# Patient Record
Sex: FEMALE | Employment: FULL TIME | URBAN - METROPOLITAN AREA
[De-identification: names, ages, dates, MRNs, and addresses within clinical notes are randomized per-mention and may not be internally consistent; named-entity substitution may affect disease eponyms.]

---

## 2019-05-24 PROCEDURE — 84165 PROTEIN E-PHORESIS SERUM: CPT | Performed by: FAMILY MEDICINE

## 2019-05-24 PROCEDURE — 81001 URINALYSIS AUTO W/SCOPE: CPT | Performed by: FAMILY MEDICINE

## 2019-05-24 PROCEDURE — 87086 URINE CULTURE/COLONY COUNT: CPT | Performed by: FAMILY MEDICINE

## 2019-05-24 PROCEDURE — 83883 ASSAY NEPHELOMETRY NOT SPEC: CPT | Performed by: FAMILY MEDICINE

## 2019-05-24 PROCEDURE — 86334 IMMUNOFIX E-PHORESIS SERUM: CPT | Performed by: FAMILY MEDICINE

## 2020-11-03 ENCOUNTER — OFFICE VISIT (OUTPATIENT)
Dept: OBGYN CLINIC | Facility: CLINIC | Age: 55
End: 2020-11-03
Payer: COMMERCIAL

## 2020-11-03 VITALS
WEIGHT: 166 LBS | DIASTOLIC BLOOD PRESSURE: 82 MMHG | BODY MASS INDEX: 29.41 KG/M2 | SYSTOLIC BLOOD PRESSURE: 131 MMHG | HEIGHT: 63 IN

## 2020-11-03 DIAGNOSIS — Z12.31 VISIT FOR SCREENING MAMMOGRAM: ICD-10-CM

## 2020-11-03 DIAGNOSIS — N76.0 VAGINITIS AND VULVOVAGINITIS: ICD-10-CM

## 2020-11-03 DIAGNOSIS — Z01.419 ENCOUNTER FOR WELL WOMAN EXAM: Primary | ICD-10-CM

## 2020-11-03 PROCEDURE — 3008F BODY MASS INDEX DOCD: CPT | Performed by: OBSTETRICS & GYNECOLOGY

## 2020-11-03 PROCEDURE — 99386 PREV VISIT NEW AGE 40-64: CPT | Performed by: OBSTETRICS & GYNECOLOGY

## 2020-11-03 PROCEDURE — 3075F SYST BP GE 130 - 139MM HG: CPT | Performed by: OBSTETRICS & GYNECOLOGY

## 2020-11-03 PROCEDURE — 3079F DIAST BP 80-89 MM HG: CPT | Performed by: OBSTETRICS & GYNECOLOGY

## 2020-11-03 NOTE — PROGRESS NOTES
Annual Gyn Exam    HPI Pt is a transfer from Dr. Jean Florez who has retired. Pt is here for an annual gynecologic evaluation. Patient does have some complaints of thin white vaginal discharge with some periclitoral irritation/itching.   She denies any odor Respiratory: Negative for cough, shortness of breath and wheezing. Cardiovascular: Negative for palpitations. Gastrointestinal: Negative for abdominal pain, blood in stool, constipation, diarrhea, nausea and vomiting.    Genitourinary: Positive for v mammogram recommendations, mammogram order placed. .  Discussed recommendation for screening colonoscopy beginning at age 48. Patient verbalized understanding. Discussed recommendations for annual flu vaccine, patient declined today.   Discussed recommenda

## 2021-01-24 PROBLEM — G89.29 CHRONIC LOW BACK PAIN WITH SCIATICA: Chronic | Status: ACTIVE | Noted: 2021-01-24

## 2021-01-24 PROBLEM — R20.2 PARESTHESIAS: Chronic | Status: ACTIVE | Noted: 2021-01-24

## 2021-01-24 PROBLEM — R73.03 PREDIABETES: Chronic | Status: ACTIVE | Noted: 2021-01-24

## 2021-01-24 PROBLEM — M54.40 CHRONIC LOW BACK PAIN WITH SCIATICA: Chronic | Status: ACTIVE | Noted: 2021-01-24

## 2021-01-24 NOTE — PATIENT INSTRUCTIONS
- You were seen in clinic for regular annual check-up. We have ordered labs for you to complete in 3 months and we will call you with the results. - We have prescribed Metformin 500 mg daily.  This will assist your diet and exercise changes as the foundati

## 2021-01-24 NOTE — H&P
Gillian Dowling is a 60year old female. HPI:   Patient presents with:  Establish Care: Previously saw a doctor in [facility] about 4 years ago. Seeing OBGYN on [employer]. Declines Flu shot. Not sure if she has had Tdap before.  Not sure of PCN allergy, w Onset   • Pulmonary Disease Mother          of COPD complications- non-smoker   • Cancer Mother 68        Vulvar cancer   • Diabetes Father    • Breast Cancer Neg    • Ovarian Cancer Neg    • Uterine Cancer Neg    • Colon Cancer Neg       Social Histor Consciousness, Syncope, Dizziness, Headache, Falls  Psych:  Anxiety, Depression, Insomnia, Suicidal Ideation, Homicidal ideation, Memory Changes  Heme/Lymph: Bruising, Bleeding, Lymphadenopathy  Endocrine: Polyuria, Polydipsia, Temperature Intolerance    PH of the normal  cervical lordosis. No compression deformity is noted. Disc space narrowing is moderate at C3-C4 and  mild at C5-C6/C7. Anterior and posterior endplate ridging is seen at these levels.  Uncinate spurring  is noted at C3-C4 and C5-C7 bilaterall has been engaging in behavioral modifications. –Repeat CBC, CMP, A1c, fasting lipids in 3 months  –Check microalbumin to creatinine ratio as well as urinalysis  -We discussed the foundation of lifestyle modifications in terms of her sugar control.   Target Referrals:  OPHTHALMOLOGY - INTERNAL       Health Maintenance  HTN Screen: At goal  DM Screen: Check A1c  HLD Screen: Check fasting lipid panel  Osteoporosis Screen (>65 or < 65 with FRAX > 9.3%):  Not indicated  HCV Screen: Considered low risk  HIV Screen:

## 2021-01-26 ENCOUNTER — OFFICE VISIT (OUTPATIENT)
Dept: INTERNAL MEDICINE CLINIC | Facility: CLINIC | Age: 56
End: 2021-01-26
Payer: COMMERCIAL

## 2021-01-26 VITALS
WEIGHT: 163.63 LBS | TEMPERATURE: 98 F | SYSTOLIC BLOOD PRESSURE: 122 MMHG | BODY MASS INDEX: 28.99 KG/M2 | HEIGHT: 63 IN | DIASTOLIC BLOOD PRESSURE: 78 MMHG | OXYGEN SATURATION: 98 % | HEART RATE: 80 BPM

## 2021-01-26 DIAGNOSIS — E11.42 TYPE 2 DIABETES MELLITUS WITH DIABETIC POLYNEUROPATHY, WITHOUT LONG-TERM CURRENT USE OF INSULIN (HCC): ICD-10-CM

## 2021-01-26 DIAGNOSIS — Z13.29 SCREENING FOR THYROID DISORDER: ICD-10-CM

## 2021-01-26 DIAGNOSIS — R73.03 PREDIABETES: Chronic | ICD-10-CM

## 2021-01-26 DIAGNOSIS — E78.5 DYSLIPIDEMIA: ICD-10-CM

## 2021-01-26 DIAGNOSIS — G89.29 CHRONIC LOW BACK PAIN WITH RIGHT-SIDED SCIATICA, UNSPECIFIED BACK PAIN LATERALITY: Chronic | ICD-10-CM

## 2021-01-26 DIAGNOSIS — M54.41 CHRONIC LOW BACK PAIN WITH RIGHT-SIDED SCIATICA, UNSPECIFIED BACK PAIN LATERALITY: Chronic | ICD-10-CM

## 2021-01-26 DIAGNOSIS — E55.9 VITAMIN D DEFICIENCY: ICD-10-CM

## 2021-01-26 DIAGNOSIS — Z13.220 SCREENING FOR LIPOID DISORDERS: ICD-10-CM

## 2021-01-26 DIAGNOSIS — Z13.0 SCREENING FOR IRON DEFICIENCY ANEMIA: ICD-10-CM

## 2021-01-26 DIAGNOSIS — R20.2 PARESTHESIAS: Chronic | ICD-10-CM

## 2021-01-26 DIAGNOSIS — Z78.9 UNKNOWN STATUS OF IMMUNITY TO COVID-19 VIRUS: ICD-10-CM

## 2021-01-26 DIAGNOSIS — Z00.00 ROUTINE GENERAL MEDICAL EXAMINATION AT A HEALTH CARE FACILITY: Primary | ICD-10-CM

## 2021-01-26 DIAGNOSIS — R79.89 ELEVATED LFTS: ICD-10-CM

## 2021-01-26 PROCEDURE — 99205 OFFICE O/P NEW HI 60 MIN: CPT | Performed by: INTERNAL MEDICINE

## 2021-01-26 PROCEDURE — 3078F DIAST BP <80 MM HG: CPT | Performed by: INTERNAL MEDICINE

## 2021-01-26 PROCEDURE — 3008F BODY MASS INDEX DOCD: CPT | Performed by: INTERNAL MEDICINE

## 2021-01-26 PROCEDURE — 3074F SYST BP LT 130 MM HG: CPT | Performed by: INTERNAL MEDICINE

## 2021-01-26 RX ORDER — PREDNISONE 20 MG/1
TABLET ORAL
COMMUNITY
Start: 2020-12-06 | End: 2021-01-26 | Stop reason: ALTCHOICE

## 2021-01-26 RX ORDER — AMOXICILLIN AND CLAVULANATE POTASSIUM 500; 125 MG/1; MG/1
TABLET, FILM COATED ORAL
COMMUNITY
Start: 2020-11-01 | End: 2021-01-26 | Stop reason: ALTCHOICE

## 2021-01-26 RX ORDER — B-COMPLEX WITH VITAMIN C
TABLET ORAL
COMMUNITY

## 2021-01-26 RX ORDER — MULTIVIT WITH MINERALS/LUTEIN
1000 TABLET ORAL DAILY
COMMUNITY

## 2021-01-26 RX ORDER — BIOTIN 1 MG
1 TABLET ORAL DAILY
COMMUNITY

## 2021-01-26 RX ORDER — METFORMIN HYDROCHLORIDE 500 MG/1
500 TABLET, EXTENDED RELEASE ORAL DAILY
Qty: 90 TABLET | Refills: 3 | Status: SHIPPED | OUTPATIENT
Start: 2021-01-26 | End: 2021-05-17

## 2021-05-17 PROBLEM — E11.69 DYSLIPIDEMIA ASSOCIATED WITH TYPE 2 DIABETES MELLITUS (HCC): Status: ACTIVE | Noted: 2021-01-26

## 2021-05-17 PROBLEM — R20.2 PARESTHESIAS: Chronic | Status: RESOLVED | Noted: 2021-01-24 | Resolved: 2021-05-17

## 2021-05-17 PROBLEM — E78.5 DYSLIPIDEMIA ASSOCIATED WITH TYPE 2 DIABETES MELLITUS (HCC): Status: ACTIVE | Noted: 2021-01-26

## 2021-05-20 PROBLEM — D25.1 INTRAMURAL LEIOMYOMA OF UTERUS: Status: ACTIVE | Noted: 2021-05-20

## 2021-06-11 ENCOUNTER — TELEPHONE (OUTPATIENT)
Dept: INTERNAL MEDICINE CLINIC | Facility: CLINIC | Age: 56
End: 2021-06-11

## 2021-06-15 ENCOUNTER — TELEPHONE (OUTPATIENT)
Dept: INTERNAL MEDICINE CLINIC | Facility: CLINIC | Age: 56
End: 2021-06-15

## 2021-06-15 NOTE — TELEPHONE ENCOUNTER
Request received from [facility] for additional info to bill recent labs done 6/8/21. Patient no longer seeing Dr. Jocelyne Mosley and has switched to Dr. Delaney Dodge. Dr. Jocelyne Mosley did not order most recent labs. Request faxed to Dr. Kamron Hernandez office, confirmation received.

## 2023-02-08 ENCOUNTER — LAB ENCOUNTER (OUTPATIENT)
Dept: LAB | Facility: HOSPITAL | Age: 58
End: 2023-02-08
Attending: INTERNAL MEDICINE
Payer: COMMERCIAL

## 2023-02-08 DIAGNOSIS — N95.8 POSTARTIFICIAL MENOPAUSAL SYNDROME: Primary | ICD-10-CM

## 2023-02-08 DIAGNOSIS — E34.9 ENDOCRINE DISORDER RELATED TO PUBERTY: ICD-10-CM

## 2023-02-08 LAB
ESTRADIOL SERPL-MCNC: 21.5 PG/ML
PROGEST SERPL-MCNC: 0.53 NG/ML

## 2023-02-08 PROCEDURE — 84144 ASSAY OF PROGESTERONE: CPT

## 2023-02-08 PROCEDURE — 36415 COLL VENOUS BLD VENIPUNCTURE: CPT

## 2023-02-08 PROCEDURE — 82670 ASSAY OF TOTAL ESTRADIOL: CPT

## (undated) NOTE — MR AVS SNAPSHOT
After Visit Summary   11/3/2020    Sandra Cabrera    MRN: [id number]           Visit Information     Date & Time  11/3/2020  4:20 PM Provider  Chang Regan MD [address] [place] [address], [facility]  Phone  [phone] It is the patient's responsibility to check with and follow their insurance company's guidelines for prior authorization for this test.  You may be held responsible for payment in full if proper authorization is not acquired.   Please contact the Patient Bu SAME DAY APPOINTMENTS   Available at primary care offices    AFTER HOURS CARE  [place]  OFFICE VISIT   Primary Care Providers  Treatment for mild illness or injury that does not require immediate attention.  Average cost  $70*   [facility]  [place] –